# Patient Record
Sex: FEMALE | Race: WHITE | HISPANIC OR LATINO | Employment: FULL TIME | URBAN - METROPOLITAN AREA
[De-identification: names, ages, dates, MRNs, and addresses within clinical notes are randomized per-mention and may not be internally consistent; named-entity substitution may affect disease eponyms.]

---

## 2020-09-08 ENCOUNTER — OFFICE VISIT (OUTPATIENT)
Dept: MEDICAL GROUP | Facility: MEDICAL CENTER | Age: 25
End: 2020-09-08
Attending: PHYSICIAN ASSISTANT
Payer: MEDICAID

## 2020-09-08 VITALS
TEMPERATURE: 97.5 F | WEIGHT: 118.1 LBS | DIASTOLIC BLOOD PRESSURE: 60 MMHG | OXYGEN SATURATION: 100 % | BODY MASS INDEX: 19.68 KG/M2 | SYSTOLIC BLOOD PRESSURE: 108 MMHG | HEART RATE: 63 BPM | HEIGHT: 65 IN

## 2020-09-08 DIAGNOSIS — Z00.00 HEALTHCARE MAINTENANCE: ICD-10-CM

## 2020-09-08 DIAGNOSIS — F33.1 MODERATE EPISODE OF RECURRENT MAJOR DEPRESSIVE DISORDER (HCC): ICD-10-CM

## 2020-09-08 DIAGNOSIS — R63.4 WEIGHT LOSS: ICD-10-CM

## 2020-09-08 DIAGNOSIS — N12 PYELONEPHRITIS: ICD-10-CM

## 2020-09-08 PROCEDURE — 99214 OFFICE O/P EST MOD 30 MIN: CPT | Performed by: PHYSICIAN ASSISTANT

## 2020-09-08 PROCEDURE — 99204 OFFICE O/P NEW MOD 45 MIN: CPT | Performed by: PHYSICIAN ASSISTANT

## 2020-09-08 RX ORDER — PHENAZOPYRIDINE HYDROCHLORIDE 200 MG/1
TABLET, FILM COATED ORAL
COMMUNITY
Start: 2020-08-25 | End: 2020-10-06

## 2020-09-08 RX ORDER — CEFDINIR 300 MG/1
CAPSULE ORAL
COMMUNITY
Start: 2020-08-25 | End: 2020-10-06

## 2020-09-08 ASSESSMENT — ENCOUNTER SYMPTOMS
WHEEZING: 0
NAUSEA: 0
DOUBLE VISION: 0
WEIGHT LOSS: 1
COUGH: 0
SORE THROAT: 0
PHOTOPHOBIA: 0
HEADACHES: 0
PALPITATIONS: 0
CLAUDICATION: 0
BLURRED VISION: 0
DIARRHEA: 0
WEAKNESS: 0
SHORTNESS OF BREATH: 0
FEVER: 0
CHILLS: 0
VOMITING: 0
CONSTIPATION: 0
NERVOUS/ANXIOUS: 0
SINUS PAIN: 0
DEPRESSION: 1
BLOOD IN STOOL: 0
DIZZINESS: 1
TINGLING: 1

## 2020-09-08 ASSESSMENT — PATIENT HEALTH QUESTIONNAIRE - PHQ9
5. POOR APPETITE OR OVEREATING: 3 - NEARLY EVERY DAY
SUM OF ALL RESPONSES TO PHQ QUESTIONS 1-9: 19
CLINICAL INTERPRETATION OF PHQ2 SCORE: 5

## 2020-09-08 ASSESSMENT — LIFESTYLE VARIABLES: SUBSTANCE_ABUSE: 0

## 2020-09-08 NOTE — ASSESSMENT & PLAN NOTE
"Alma Rosa reports a history of significant weight loss that started last fall. She reports that she had a significantly decreased appetite in which she has not wanted to eat. She states that she had lost 20 pounds in the fall due to this and ever since has had difficulty in gaining the weight back. She states that since then her appetite has returned, however, she has noticed that she is unable to eat dairy or meat and is unsure as to why. She reports that last Thursday she was grabbing something to eat because she was hungry and all of a sudden started experiencing nausea, \"full body tingling\", dizziness and vision changes that she described as \"the sides of her vision going black\". She reports that this lasted for 2 minutes and then resolved. She has not experienced another episode since.     "

## 2020-09-08 NOTE — ASSESSMENT & PLAN NOTE
26 yo female who presents today for evaluation of depressed mood and/or loss of interests/pleasure most days. There is no imminent risk of serious self-harm/suicide. PHQ9 obtained in clinic today revealed a score of 19 indicating moderately-severe depression. No medications, comorbid medical conditions or substance abuse causing depression. Denies hx of treatment resistant depression, current ash, hypomania or psychosis. No personal or FMHx of bipolar disorder.

## 2020-09-08 NOTE — PROGRESS NOTES
"Chief Complaint   Patient presents with   • Establish Care   • Hospital Follow-up     uti       Subjective:     HPI:   Alma Rosa Villalobos is a 25 y.o. female here to establish care,  and to discuss the evaluation and management of:    Weight loss  Alma Rosa reports a history of significant weight loss that started last fall. She reports that she had a significantly decreased appetite in which she has not wanted to eat. She states that she had lost 20 pounds in the fall due to this and ever since has had difficulty in gaining the weight back. She states that since then her appetite has returned, however, she has noticed that she is unable to eat dairy or meat and is unsure as to why. She reports that last Thursday she was grabbing something to eat because she was hungry and all of a sudden started experiencing nausea, \"full body tingling\", dizziness and vision changes that she described as \"the sides of her vision going black\". She reports that this lasted for 2 minutes and then resolved. She has not experienced another episode since.     Moderate episode of recurrent major depressive disorder (HCC)  26 yo female who presents today for evaluation of depressed mood and/or loss of interests/pleasure most days. There is no imminent risk of serious self-harm/suicide. PHQ9 obtained in clinic today revealed a score of 19 indicating moderately-severe depression. No medications, comorbid medical conditions or substance abuse causing depression. Denies hx of treatment resistant depression, current ash, hypomania or psychosis. No personal or FMHx of bipolar disorder.    Pyelonephritis  Alma Rosa reports today for a hospital follow up for Pyelonephritis that occurred 3 weeks ago. She is currently on an antibiotic regimen that she will be finishing up in the next 2 days. She reports that her symptoms have resolved and she is feeling much better. She has no concerns regarding this today.        ROS  Review of Systems "   Constitutional: Positive for weight loss. Negative for chills, fever and malaise/fatigue.   HENT: Negative for congestion, sinus pain and sore throat.    Eyes: Negative for blurred vision, double vision and photophobia.   Respiratory: Negative for cough, shortness of breath and wheezing.    Cardiovascular: Negative for chest pain, palpitations, claudication and leg swelling.   Gastrointestinal: Negative for blood in stool, constipation, diarrhea, melena, nausea and vomiting.   Genitourinary: Negative for dysuria, frequency, hematuria and urgency.   Skin: Negative for itching and rash.   Neurological: Positive for dizziness and tingling. Negative for weakness and headaches.   Psychiatric/Behavioral: Positive for depression. Negative for substance abuse and suicidal ideas. The patient is not nervous/anxious.        No Known Allergies    Current medicines (including changes today)  Current Outpatient Medications   Medication Sig Dispense Refill   • cefdinir (OMNICEF) 300 MG Cap TK ONE C PO  Q 12 H FOR 10 DAYS     • phenazopyridine (PYRIDIUM) 200 MG Tab TK 1 T PO Q EIGHT H PRF URINARY PAIN       No current facility-administered medications for this visit.      She  has a past medical history of Urinary tract infection, site not specified. She also has no past medical history of Headache(784.0).  She  has no past surgical history on file.  Social History     Tobacco Use   • Smoking status: Never Smoker   • Smokeless tobacco: Never Used   Substance Use Topics   • Alcohol use: No   • Drug use: No       Family History   Problem Relation Age of Onset   • Genetic Disorder Mother         Graves   • Arthritis Mother         RA   • Genetic Disorder Maternal Aunt         Endometriosis   • Genetic Disorder Maternal Uncle         Graves   • Diabetes Maternal Grandmother    • Heart Disease Maternal Grandmother    • Hypertension Maternal Grandmother    • Hyperlipidemia Maternal Grandmother      Family Status   Relation Name Status  "  • Mo  Alive   • Fa  Alive   • Sis ##Sis1 Alive   • Bro ##Bro1 Alive   • MAunt ##MAunt1 Alive   • MUnc ##MUnc1 Alive   • PAunt ##PAunt1 Alive   • PUnc ##PUnc1 Alive   • MGMo  Alive   • MGFa  Alive   • PGMo  Alive   • PGFa  Alive       Patient Active Problem List    Diagnosis Date Noted   • Pyelonephritis 09/08/2020   • Weight loss 09/08/2020   • Moderate episode of recurrent major depressive disorder (HCC) 09/08/2020        Objective:     /60 (BP Location: Right arm, Patient Position: Sitting, BP Cuff Size: Adult)   Pulse 63   Temp 36.4 °C (97.5 °F) (Temporal)   Ht 1.651 m (5' 5\")   Wt 53.6 kg (118 lb 1.6 oz)   SpO2 100%  Body mass index is 19.65 kg/m².    Physical Exam:  Physical Exam   Constitutional: She is oriented to person, place, and time and well-developed, well-nourished, and in no distress.   HENT:   Head: Normocephalic.   Right Ear: External ear normal.   Left Ear: External ear normal.   Eyes: Pupils are equal, round, and reactive to light.   Neck: Normal range of motion. No thyromegaly present.   Cardiovascular: Normal rate, regular rhythm and normal heart sounds.   Pulmonary/Chest: Effort normal and breath sounds normal.   Musculoskeletal: Normal range of motion.   Lymphadenopathy:     She has no cervical adenopathy.        Right: No supraclavicular adenopathy present.        Left: No supraclavicular adenopathy present.   Neurological: She is alert and oriented to person, place, and time. She has normal motor skills, normal sensation and normal strength. Gait normal.   Skin: Skin is warm and dry.   Psychiatric: Affect and judgment normal.   Vitals reviewed.       Assessment and Plan:     The following treatment plan was discussed:    1. Weight loss  - This is a chronic condition that has plateaued.   - Patient is experiencing associated symptoms that may indicated a thyroid disorder vs vitamin deficiency vs anemia.  - Exam benign today in the clinic.  - We will order labs for further " evaluation.     2. Healthcare maintenance  - CBC WITHOUT DIFFERENTIAL; Future  - HEMOGLOBIN A1C; Future  - TSH; Future  - FREE THYROXINE; Future  - VITAMIN B12; Future  - VITAMIN D,25 HYDROXY; Future  - [facility] the [facility] once I have received and reviewed her lab results.     3. Moderate episode of recurrent major depressive disorder (HCC)  - This is a chronic condition.   - No imminent risk of suicidal or homicidal ideation.   - PHQ-9 performed in clinic today revealed a score of 19 indicating moderately severe depression.  - The patient would benefit greatly from combination therapy with a medication and CBT.    - However, the patient would like to forego medication at this time and solely focus on therapy.   - REFERRAL TO PSYCHOLOGY for CBT.   - I will check in on the patient in 6 weeks to see how therapy is going.     4. Pyelonephritis  - Healing well. Vitals are stable.   - Continue with antibiotic regimen until completed.   - If symptoms return, scheduled a follow up visit for further evaluation.        Any change or worsening of signs or symptoms, patient encouraged to follow-up or report to emergency room for further evaluation. Patient verbalizes understanding and agrees.    Follow-Up: Return if symptoms worsen or fail to improve.      PLEASE NOTE: This dictation was created using voice recognition software. I have made every reasonable attempt to correct obvious errors, but I expect that there are errors of grammar and possibly content that I did not discover before finalizing the note.

## 2020-09-08 NOTE — ASSESSMENT & PLAN NOTE
Alma Rosa reports today for a hospital follow up for Pyelonephritis that occurred 3 weeks ago. She is currently on an antibiotic regimen that she will be finishing up in the next 2 days. She reports that her symptoms have resolved and she is feeling much better. She has no concerns regarding this today.

## 2020-09-11 NOTE — RESULT ENCOUNTER NOTE
Results reviewed and released to [employer].    No anemia, vitamin deficiencies or thyroid disorder.  Vitamin D low requiring re-supplementation.

## 2020-10-06 ENCOUNTER — NURSE TRIAGE (OUTPATIENT)
Dept: HEALTH INFORMATION MANAGEMENT | Facility: OTHER | Age: 25
End: 2020-10-06

## 2020-10-06 ENCOUNTER — HOSPITAL ENCOUNTER (OUTPATIENT)
Facility: MEDICAL CENTER | Age: 25
End: 2020-10-06
Attending: PHYSICIAN ASSISTANT
Payer: COMMERCIAL

## 2020-10-06 ENCOUNTER — OFFICE VISIT (OUTPATIENT)
Dept: URGENT CARE | Facility: PHYSICIAN GROUP | Age: 25
End: 2020-10-06
Payer: COMMERCIAL

## 2020-10-06 VITALS
WEIGHT: 118 LBS | SYSTOLIC BLOOD PRESSURE: 110 MMHG | DIASTOLIC BLOOD PRESSURE: 72 MMHG | HEIGHT: 65 IN | BODY MASS INDEX: 19.66 KG/M2 | TEMPERATURE: 98 F | HEART RATE: 102 BPM | OXYGEN SATURATION: 96 %

## 2020-10-06 DIAGNOSIS — N12 PYELONEPHRITIS: Primary | ICD-10-CM

## 2020-10-06 DIAGNOSIS — N12 PYELONEPHRITIS: ICD-10-CM

## 2020-10-06 DIAGNOSIS — R10.9 BILATERAL FLANK PAIN: ICD-10-CM

## 2020-10-06 DIAGNOSIS — R11.0 NAUSEA: ICD-10-CM

## 2020-10-06 DIAGNOSIS — R50.9 FEVER, UNSPECIFIED FEVER CAUSE: ICD-10-CM

## 2020-10-06 LAB
APPEARANCE UR: CLEAR
BILIRUB UR STRIP-MCNC: NORMAL MG/DL
COLOR UR AUTO: YELLOW
GLUCOSE UR STRIP.AUTO-MCNC: NORMAL MG/DL
INT CON NEG: NEGATIVE
INT CON POS: POSITIVE
KETONES UR STRIP.AUTO-MCNC: 80 MG/DL
LEUKOCYTE ESTERASE UR QL STRIP.AUTO: NORMAL
NITRITE UR QL STRIP.AUTO: NORMAL
PH UR STRIP.AUTO: 6 [PH] (ref 5–8)
POC URINE PREGNANCY TEST: NORMAL
PROT UR QL STRIP: 30 MG/DL
RBC UR QL AUTO: NORMAL
SP GR UR STRIP.AUTO: 1.01
UROBILINOGEN UR STRIP-MCNC: 0.2 MG/DL

## 2020-10-06 PROCEDURE — 99204 OFFICE O/P NEW MOD 45 MIN: CPT | Performed by: PHYSICIAN ASSISTANT

## 2020-10-06 PROCEDURE — 81025 URINE PREGNANCY TEST: CPT | Performed by: PHYSICIAN ASSISTANT

## 2020-10-06 PROCEDURE — 87077 CULTURE AEROBIC IDENTIFY: CPT

## 2020-10-06 PROCEDURE — 87086 URINE CULTURE/COLONY COUNT: CPT

## 2020-10-06 PROCEDURE — 87186 SC STD MICRODIL/AGAR DIL: CPT

## 2020-10-06 PROCEDURE — 81002 URINALYSIS NONAUTO W/O SCOPE: CPT | Performed by: PHYSICIAN ASSISTANT

## 2020-10-06 RX ORDER — CIPROFLOXACIN 500 MG/1
500 TABLET, FILM COATED ORAL EVERY 12 HOURS
Qty: 14 TAB | Refills: 0 | Status: SHIPPED | OUTPATIENT
Start: 2020-10-06 | End: 2020-10-13

## 2020-10-06 NOTE — TELEPHONE ENCOUNTER
Regarding: kidney and abdominal pain also fevers  ----- Message from Taylor Nissen sent at 10/6/2020 12:26 PM PDT -----  Patient states that they have been having kidney pain, fevers and abdominal pain.

## 2020-10-06 NOTE — TELEPHONE ENCOUNTER
"Patient is reporting pain on both sides of her back. Headaches since last week.  Vomiting, diarrhea, Fever since Wednesday (100).  No burning or stinging with urination.  Patient reports history of UTI since she was young.  She does not feel like this is UTI symptoms.    This is the 3 time patient reports these symptoms.  Pain was so bad once and she went to ED ([facility]) and given something for pain.    Reason for Disposition  • Constant abdominal pain lasting > 2 hours    Additional Information  • Negative: Passed out (i.e., fainted, collapsed and was not responding)  • Negative: Shock suspected (e.g., cold/pale/clammy skin, too weak to stand, low BP, rapid pulse)  • Negative: Sounds like a life-threatening emergency to the triager  • Negative: Chest pain  • Negative: Pain is mainly in upper abdomen (if needed ask: 'is it mainly above the belly button?')  • Negative: Abdominal pain and pregnant > 20 weeks  • Negative: Abdominal pain and pregnant < 20 weeks  • Negative: SEVERE abdominal pain (e.g., excruciating)  • Negative: Vomiting red blood or black (coffee ground) material  • Negative: Bloody, black, or tarry bowel movements    Answer Assessment - Initial Assessment Questions  1. LOCATION: \"Where does it hurt?\"       Lower back sides and stomach  2. RADIATION: \"Does the pain shoot anywhere else?\" (e.g., chest, back)      Around the sides  3. ONSET: \"When did the pain begin?\" (e.g., minutes, hours or days ago)       Last week but continues to get worse  4. SUDDEN: \"Gradual or sudden onset?\"      gradually  5. PATTERN \"Does the pain come and go, or is it constant?\"     - If constant: \"Is it getting better, staying the same, or worsening?\"       (Note: Constant means the pain never goes away completely; most serious pain is constant and it progresses)      - If intermittent: \"How long does it last?\" \"Do you have pain now?\"      (Note: Intermittent means the pain goes away completely between bouts)      no  6. " "SEVERITY: \"How bad is the pain?\"  (e.g., Scale 1-10; mild, moderate, or severe)    - MILD (1-3): doesn't interfere with normal activities, abdomen soft and not tender to touch     - MODERATE (4-7): interferes with normal activities or awakens from sleep, tender to touch     - SEVERE (8-10): excruciating pain, doubled over, unable to do any normal activities       6/10  7. RECURRENT SYMPTOM: \"Have you ever had this type of abdominal pain before?\" If so, ask: \"When was the last time?\" and \"What happened that time?\"       3 times before and went to [facility]  8. CAUSE: \"What do you think is causing the abdominal pain?\"      unknown  9. RELIEVING/AGGRAVATING FACTORS: \"What makes it better or worse?\" (e.g., movement, antacids, bowel movement)      no  10. OTHER SYMPTOMS: \"Has there been any vomiting, diarrhea, constipation, or urine problems?\"        Fever, headaches, diarrhea  11. PREGNANCY: \"Is there any chance you are pregnant?\" \"When was your last menstrual period?\"        No, but had different period with spotting that ended on Saturday    Protocols used: ABDOMINAL PAIN - FEMALE-A-OH      "

## 2020-10-07 NOTE — PROGRESS NOTES
Subjective:   Pt is a 25 y.o. female who presents with Emesis (pelvic pain, fever , headaches L side flank pain x5days)            HPI  Negative per pt.   Pt notes B/L flank pain, fevers, headaches, NV and lower abd pain x 5 days. Pt has not taken any Rx medications for this condition. Pt states the pain is a 5/10, aching in nature and worse at night. Pt denies CP, SOB, diarrhea, paresthesias,  dizziness, change in vision, hives, or other joint pain. The pt's medication list, problem list, and allergies have been evaluated and reviewed during today's visit.    PMH:  Past Medical History:   Diagnosis Date   • Urinary tract infection, site not specified        PSH:  Negative per pt.      Fam Hx:  family history includes Arthritis in her mother; Diabetes in her maternal grandmother; Genetic Disorder in her maternal aunt, maternal uncle, and mother; Heart Disease in her maternal grandmother; Hyperlipidemia in her maternal grandmother; Hypertension in her maternal grandmother.  Family Status   Relation Name Status   • Mo  Alive   • Fa  Alive   • Sis  Alive   • Bro  Alive   • MAunt  Alive   • MUnc  Alive   • PAunt  Alive   • PUnc  Alive   • MGMo  Alive   • MGFa  Alive   • PGMo  Alive   • PGFa  Alive       Soc HX:  Social History     Socioeconomic History   • Marital status: Single     Spouse name: Not on file   • Number of children: Not on file   • Years of education: Not on file   • Highest education level: Not on file   Occupational History   • Not on file   Social Needs   • Financial resource strain: Not on file   • Food insecurity     Worry: Not on file     Inability: Not on file   • Transportation needs     Medical: Not on file     Non-medical: Not on file   Tobacco Use   • Smoking status: Never Smoker   • Smokeless tobacco: Never Used   Substance and Sexual Activity   • Alcohol use: No   • Drug use: Yes     Types: Marijuana   • Sexual activity: Never   Lifestyle   • Physical activity     Days per week: Not on file  "    Minutes per session: Not on file   • Stress: Not on file   Relationships   • Social connections     Talks on phone: Not on file     Gets together: Not on file     Attends Islam service: Not on file     Active member of club or organization: Not on file     Attends meetings of clubs or organizations: Not on file     Relationship status: Not on file   • Intimate partner violence     Fear of current or ex partner: Not on file     Emotionally abused: Not on file     Physically abused: Not on file     Forced sexual activity: Not on file   Other Topics Concern   • Not on file   Social History Narrative   • Not on file         Medications:    Current Outpatient Medications:   •  ciprofloxacin (CIPRO) 500 MG Tab, Take 1 Tab by mouth every 12 hours for 7 days., Disp: 14 Tab, Rfl: 0      Allergies:  Patient has no known allergies.    ROS  Review of Systems   Constitutional: Negative for fever, chills and malaise/fatigue.   HENT: Negative for congestion and sore throat.    Eyes: Negative for blurred vision, double vision and photophobia.   Respiratory: Negative for cough and shortness of breath.    Cardiovascular: Negative for chest pain and palpitations.   Gastrointestinal: +nausea, vomiting, lower abdominal pain, NEG diarrhea and constipation.   Genitourinary: Positive for +flank pain.   Musculoskeletal: Negative for joint pain and myalgias.   Skin: Negative for rash.   Neurological: Negative for dizziness, tingling and +headaches.   Endo/Heme/Allergies: Does not bruise/bleed easily.   Psychiatric/Behavioral: Negative for depression. The patient is not nervous/anxious.           Objective:     /72   Pulse (!) 102   Temp 36.7 °C (98 °F) (Temporal)   Ht 1.651 m (5' 5\")   Wt 53.5 kg (118 lb)   LMP 09/23/2020   SpO2 96%   BMI 19.64 kg/m²      Physical Exam  Constitutional: PT is oriented to person, place, and time. PT appears well-developed and well-nourished. No distress.   HENT:   Head: Normocephalic and " atraumatic.   Mouth/Throat: Oropharynx is clear and moist. No oropharyngeal exudate.   Eyes: Conjunctivae normal and EOM are normal. Pupils are equal, round, and reactive to light.   Neck: Normal range of motion. Neck supple. No thyromegaly present.   Cardiovascular: Normal rate, regular rhythm, normal heart sounds and intact distal pulses.  Exam reveals no gallop and no friction rub.    No murmur heard.  Pulmonary/Chest: Effort normal and breath sounds normal. No respiratory distress. PT has no wheezes. PT has no rales. Pt exhibits no tenderness.   Abdominal: Soft. Bowel sounds are normal. PT exhibits no distension and no mass. There is no tenderness. There is no rebound and no guarding.   Musculoskeletal: Normal range of motion. PT exhibits no edema and no tenderness.   Neurological: PT is alert and oriented to person, place, and time. PT has normal reflexes. No cranial nerve deficit.   Skin: Skin is warm and dry. No rash noted. PT is not diaphoretic. No erythema.       Psychiatric: PT has a normal mood and affect. PT behavior is normal. Judgment and thought content normal.             Assessment/Plan:        1. Pyelonephritis     - POCT Urinalysis-->LEUKS AND BLOOD  - POCT PREGNANCY-->NEG    2. Fever, unspecified fever cause      3. Nausea      4. Bilateral flank pain    - Urine Culture; Future  - ciprofloxacin (CIPRO) 500 MG Tab; Take 1 Tab by mouth every 12 hours for 7 days.  Dispense: 14 Tab; Refill: 0      STRICT ER precautions given  Rest, fluids encouraged.  AVS with medical info given.  Pt was in full understanding and agreement with the plan.  Differential diagnosis, natural history, supportive care, and indications for immediate follow-up discussed. All questions answered. Patient agrees with the plan of care.  Follow-up as needed if symptoms worsen or fail to improve to PCP, Urgent care or Emergency Room.

## 2020-10-07 NOTE — PATIENT INSTRUCTIONS
Pyelonephritis, Adult    Pyelonephritis is an infection that occurs in the kidney. The kidneys are organs that help clean the blood by moving waste out of the blood and into the pee (urine). This infection can happen quickly, or it can last for a long time. In most cases, it clears up with treatment and does not cause other problems.  What are the causes?  This condition may be caused by:  · Germs (bacteria) going from the bladder up to the kidney. This may happen after having a bladder infection.  · Germs going from the blood to the kidney.  What increases the risk?  This condition is more likely to develop in:  · Pregnant women.  · Older people.  · People who have any of these conditions:  ? Diabetes.  ? Inflammation of the prostate gland (prostatitis), in males.  ? Kidney stones or bladder stones.  ? Other problems with the kidney or the parts of your body that carry pee from the kidneys to the bladder (ureters).  ? Cancer.  · People who have a small, thin tube (catheter) placed in the bladder.  · People who are sexually active.  · Women who use a medicine that kills sperm (spermicide) to prevent pregnancy.  · People who have had a prior urinary tract infection (UTI).  What are the signs or symptoms?  Symptoms of this condition include:  · Peeing often.  · A strong urge to pee right away.  · Burning or stinging when peeing.  · Belly pain.  · Back pain.  · Pain in the side (flank area).  · Fever or chills.  · Blood in the pee, or dark pee.  · Feeling sick to your stomach (nauseous) or throwing up (vomiting).  How is this treated?  This condition may be treated by:  · Taking antibiotic medicines by mouth (orally).  · Drinking enough fluids.  If the infection is bad, you may need to stay in the hospital. You may be given antibiotics and fluids that are put directly into a vein through an IV tube.  In some cases, other treatments may be needed.  Follow these instructions at home:  Medicines  · Take your  antibiotic medicine as told by your doctor. Do not stop taking the antibiotic even if you start to feel better.  · Take over-the-counter and prescription medicines only as told by your doctor.  General instructions    · Drink enough fluid to keep your pee pale yellow.  · Avoid caffeine, tea, and carbonated drinks.  · Pee (urinate) often. Avoid holding in pee for long periods of time.  · Pee before and after sex.  · After pooping (having a bowel movement), women should wipe from front to back. Use each tissue only once.  · Keep all follow-up visits as told by your doctor. This is important.  Contact a doctor if:  · You do not feel better after 2 days.  · Your symptoms get worse.  · You have a fever.  Get help right away if:  · You cannot take your medicine or drink fluids as told.  · You have chills and shaking.  · You throw up.  · You have very bad pain in your side or back.  · You feel very weak or you pass out (faint).  Summary  · Pyelonephritis is an infection that occurs in the kidney.  · In most cases, this infection clears up with treatment and does not cause other problems.  · Take your antibiotic medicine as told by your doctor. Do not stop taking the antibiotic even if you start to feel better.  · Drink enough fluid to keep your pee pale yellow.  This information is not intended to replace advice given to you by your health care provider. Make sure you discuss any questions you have with your health care provider.  Document Released: 01/25/2006 Document Revised: 10/22/2019 Document Reviewed: 10/22/2019  Elsevier Patient Education © 2020 [employer]

## 2020-10-09 ENCOUNTER — TELEPHONE (OUTPATIENT)
Dept: URGENT CARE | Facility: CLINIC | Age: 25
End: 2020-10-09

## 2020-10-09 LAB
BACTERIA UR CULT: ABNORMAL
BACTERIA UR CULT: ABNORMAL
SIGNIFICANT IND 70042: ABNORMAL
SITE SITE: ABNORMAL
SOURCE SOURCE: ABNORMAL

## 2020-10-09 NOTE — TELEPHONE ENCOUNTER
Called and left message with pt about urine culture results which came back positive for E.coli.   I told her she could continue the abx therapy with a positive urine culture which was sensitive to the abx she was placed on.  Encouraged Pt to call back with questions.  Darryl Gardner PA-C

## 2023-04-06 PROCEDURE — 93005 ELECTROCARDIOGRAM TRACING: CPT

## 2023-04-06 PROCEDURE — 93005 ELECTROCARDIOGRAM TRACING: CPT | Performed by: STUDENT IN AN ORGANIZED HEALTH CARE EDUCATION/TRAINING PROGRAM

## 2023-04-06 PROCEDURE — 99283 EMERGENCY DEPT VISIT LOW MDM: CPT

## 2023-04-07 ENCOUNTER — APPOINTMENT (OUTPATIENT)
Dept: RADIOLOGY | Facility: MEDICAL CENTER | Age: 28
End: 2023-04-07
Attending: STUDENT IN AN ORGANIZED HEALTH CARE EDUCATION/TRAINING PROGRAM
Payer: MEDICAID

## 2023-04-07 ENCOUNTER — HOSPITAL ENCOUNTER (EMERGENCY)
Facility: MEDICAL CENTER | Age: 28
End: 2023-04-07
Attending: STUDENT IN AN ORGANIZED HEALTH CARE EDUCATION/TRAINING PROGRAM
Payer: MEDICAID

## 2023-04-07 VITALS
TEMPERATURE: 97.8 F | HEART RATE: 66 BPM | WEIGHT: 126.1 LBS | BODY MASS INDEX: 20.27 KG/M2 | OXYGEN SATURATION: 97 % | DIASTOLIC BLOOD PRESSURE: 70 MMHG | HEIGHT: 66 IN | RESPIRATION RATE: 18 BRPM | SYSTOLIC BLOOD PRESSURE: 132 MMHG

## 2023-04-07 DIAGNOSIS — R07.2 SUBSTERNAL CHEST PAIN: ICD-10-CM

## 2023-04-07 LAB
ALBUMIN SERPL BCP-MCNC: 4.2 G/DL (ref 3.2–4.9)
ALBUMIN/GLOB SERPL: 1.3 G/DL
ALP SERPL-CCNC: 94 U/L (ref 30–99)
ALT SERPL-CCNC: 8 U/L (ref 2–50)
ANION GAP SERPL CALC-SCNC: 11 MMOL/L (ref 7–16)
AST SERPL-CCNC: 15 U/L (ref 12–45)
BASOPHILS # BLD AUTO: 0.8 % (ref 0–1.8)
BASOPHILS # BLD: 0.07 K/UL (ref 0–0.12)
BILIRUB SERPL-MCNC: 0.2 MG/DL (ref 0.1–1.5)
BUN SERPL-MCNC: 15 MG/DL (ref 8–22)
CALCIUM ALBUM COR SERPL-MCNC: 8.6 MG/DL (ref 8.5–10.5)
CALCIUM SERPL-MCNC: 8.8 MG/DL (ref 8.5–10.5)
CHLORIDE SERPL-SCNC: 105 MMOL/L (ref 96–112)
CO2 SERPL-SCNC: 23 MMOL/L (ref 20–33)
CREAT SERPL-MCNC: 0.66 MG/DL (ref 0.5–1.4)
EKG IMPRESSION: NORMAL
EOSINOPHIL # BLD AUTO: 0.27 K/UL (ref 0–0.51)
EOSINOPHIL NFR BLD: 2.9 % (ref 0–6.9)
ERYTHROCYTE [DISTWIDTH] IN BLOOD BY AUTOMATED COUNT: 43.3 FL (ref 35.9–50)
GFR SERPLBLD CREATININE-BSD FMLA CKD-EPI: 123 ML/MIN/1.73 M 2
GLOBULIN SER CALC-MCNC: 3.2 G/DL (ref 1.9–3.5)
GLUCOSE SERPL-MCNC: 111 MG/DL (ref 65–99)
HCT VFR BLD AUTO: 38 % (ref 37–47)
HGB BLD-MCNC: 12.7 G/DL (ref 12–16)
IMM GRANULOCYTES # BLD AUTO: 0.02 K/UL (ref 0–0.11)
IMM GRANULOCYTES NFR BLD AUTO: 0.2 % (ref 0–0.9)
LYMPHOCYTES # BLD AUTO: 2.27 K/UL (ref 1–4.8)
LYMPHOCYTES NFR BLD: 24.5 % (ref 22–41)
MCH RBC QN AUTO: 29.3 PG (ref 27–33)
MCHC RBC AUTO-ENTMCNC: 33.4 G/DL (ref 33.6–35)
MCV RBC AUTO: 87.8 FL (ref 81.4–97.8)
MONOCYTES # BLD AUTO: 0.64 K/UL (ref 0–0.85)
MONOCYTES NFR BLD AUTO: 6.9 % (ref 0–13.4)
NEUTROPHILS # BLD AUTO: 5.99 K/UL (ref 2–7.15)
NEUTROPHILS NFR BLD: 64.7 % (ref 44–72)
NRBC # BLD AUTO: 0 K/UL
NRBC BLD-RTO: 0 /100 WBC
PLATELET # BLD AUTO: 274 K/UL (ref 164–446)
PMV BLD AUTO: 9.7 FL (ref 9–12.9)
POTASSIUM SERPL-SCNC: 3.8 MMOL/L (ref 3.6–5.5)
PROT SERPL-MCNC: 7.4 G/DL (ref 6–8.2)
RBC # BLD AUTO: 4.33 M/UL (ref 4.2–5.4)
SODIUM SERPL-SCNC: 139 MMOL/L (ref 135–145)
TROPONIN T SERPL-MCNC: <6 NG/L (ref 6–19)
WBC # BLD AUTO: 9.3 K/UL (ref 4.8–10.8)

## 2023-04-07 PROCEDURE — 71045 X-RAY EXAM CHEST 1 VIEW: CPT

## 2023-04-07 PROCEDURE — A9270 NON-COVERED ITEM OR SERVICE: HCPCS | Performed by: STUDENT IN AN ORGANIZED HEALTH CARE EDUCATION/TRAINING PROGRAM

## 2023-04-07 PROCEDURE — 80053 COMPREHEN METABOLIC PANEL: CPT

## 2023-04-07 PROCEDURE — 85025 COMPLETE CBC W/AUTO DIFF WBC: CPT

## 2023-04-07 PROCEDURE — 84484 ASSAY OF TROPONIN QUANT: CPT

## 2023-04-07 PROCEDURE — 700102 HCHG RX REV CODE 250 W/ 637 OVERRIDE(OP): Performed by: STUDENT IN AN ORGANIZED HEALTH CARE EDUCATION/TRAINING PROGRAM

## 2023-04-07 PROCEDURE — 36415 COLL VENOUS BLD VENIPUNCTURE: CPT

## 2023-04-07 RX ORDER — OMEPRAZOLE 20 MG/1
20 CAPSULE, DELAYED RELEASE ORAL DAILY
Qty: 30 CAPSULE | Refills: 0 | Status: SHIPPED | OUTPATIENT
Start: 2023-04-07 | End: 2023-04-12

## 2023-04-07 RX ORDER — OMEPRAZOLE 20 MG/1
20 CAPSULE, DELAYED RELEASE ORAL ONCE
Status: COMPLETED | OUTPATIENT
Start: 2023-04-07 | End: 2023-04-07

## 2023-04-07 RX ORDER — ACETAMINOPHEN 325 MG/1
650 TABLET ORAL ONCE
Status: COMPLETED | OUTPATIENT
Start: 2023-04-07 | End: 2023-04-07

## 2023-04-07 RX ORDER — ALUMINA, MAGNESIA, AND SIMETHICONE 2400; 2400; 240 MG/30ML; MG/30ML; MG/30ML
20 SUSPENSION ORAL ONCE
Status: COMPLETED | OUTPATIENT
Start: 2023-04-07 | End: 2023-04-07

## 2023-04-07 RX ADMIN — ACETAMINOPHEN 650 MG: 325 TABLET, FILM COATED ORAL at 03:00

## 2023-04-07 RX ADMIN — ALUMINUM HYDROXIDE, MAGNESIUM HYDROXIDE, AND DIMETHICONE 20 ML: 400; 400; 40 SUSPENSION ORAL at 01:22

## 2023-04-07 RX ADMIN — OMEPRAZOLE 20 MG: 20 CAPSULE, DELAYED RELEASE ORAL at 01:22

## 2023-04-07 ASSESSMENT — PAIN DESCRIPTION - PAIN TYPE
TYPE: ACUTE PAIN
TYPE: ACUTE PAIN

## 2023-04-07 NOTE — ED NOTES
PIV initiated. Blood drawn and sent to lab. Pt medicated and updated on POC. Resting comfortably with family at bedside

## 2023-04-07 NOTE — ED PROVIDER NOTES
"ED Provider Note    CHIEF COMPLAINT  Chief Complaint   Patient presents with    Chest Pain     Left sided, non-radiating chest pain x2 hours.        EXTERNAL RECORDS REVIEWED  Outpatient Notes no recent ED visits or outpatient visits since 2020    HPI/ROS  LIMITATION TO HISTORY   Select: : None    Alma Rosa Villalobos is a 27 y.o. female who presents with substernal and left-sided chest pain that started 2 hours ago and woke her up from sleep.  Patient states pain was worst when she was laying flat, was bad enough she had difficulty getting up.  She states similar pain has happened multiple times before, but this is the worst degree of pain she has had.  She denies associated shortness of breath, nausea, vomiting.  Denies any palpitations, dizziness, lightheadedness.  Denies hemoptysis, history of DVT or PE, denies recent surgeries immobilizations, leg pain, leg swelling, family history of bleeding or clotting disorders.  Denies any recent illnesses.    PAST MEDICAL HISTORY  Past Medical History:   Diagnosis Date    Urinary tract infection, site not specified         SURGICAL HISTORY  No past surgical history on file.     FAMILY HISTORY  Family History   Problem Relation Age of Onset    Genetic Disorder Mother         Graves    Arthritis Mother         RA    Genetic Disorder Maternal Aunt         Endometriosis    Genetic Disorder Maternal Uncle         Graves    Diabetes Maternal Grandmother     Heart Disease Maternal Grandmother     Hypertension Maternal Grandmother     Hyperlipidemia Maternal Grandmother        SOCIAL HISTORY       CURRENT MEDICATIONS  Home Medications    Medication Sig Taking? Last Dose Authorizing Provider   omeprazole (PRILOSEC) 20 MG delayed-release capsule Take 1 Capsule by mouth every day. Yes  Yue Dee M.D.       ALLERGIES  No Known Allergies    PHYSICAL EXAM  /70   Pulse 66   Temp 36.6 °C (97.8 °F) (Temporal)   Resp 18   Ht 1.676 m (5' 6\")   Wt 57.2 kg (126 lb 1.7 oz)  "  SpO2 97%   Constitutional: Alert in no apparent distress.  HENT: No signs of trauma, Bilateral external ears normal, Nose normal.   Eyes: Pupils are equal and reactive, Conjunctiva normal, Non-icteric.   Neck: Normal range of motion, No tenderness, Supple, No stridor.   Cardiovascular: Regular rate and rhythm, no murmurs.  Mild tenderness over the sternum.  Thorax & Lungs: Normal breath sounds, No respiratory distress, No wheezing  Abdomen: Soft, No tenderness, No peritoneal signs, No masses, No pulsatile masses.   Skin: Warm, Dry, No erythema, No rash.   Extremities: Intact distal pulses, No edema, No tenderness, No cyanosis  Musculoskeletal: Good range of motion in all major joints. No  major deformities noted.   Neurologic: Alert , Normal motor function, Normal speech, No focal deficits noted.   Psychiatric: Affect normal, Judgment normal, Mood normal.         DIAGNOSTIC STUDIES / PROCEDURES    LABS & EKG  I have independently interpreted this EKG     Results for orders placed or performed during the hospital encounter of 04/07/23   CBC WITH DIFFERENTIAL   Result Value Ref Range    WBC 9.3 4.8 - 10.8 K/uL    RBC 4.33 4.20 - 5.40 M/uL    Hemoglobin 12.7 12.0 - 16.0 g/dL    Hematocrit 38.0 37.0 - 47.0 %    MCV 87.8 81.4 - 97.8 fL    MCH 29.3 27.0 - 33.0 pg    MCHC 33.4 (L) 33.6 - 35.0 g/dL    RDW 43.3 35.9 - 50.0 fL    Platelet Count 274 164 - 446 K/uL    MPV 9.7 9.0 - 12.9 fL    Neutrophils-Polys 64.70 44.00 - 72.00 %    Lymphocytes 24.50 22.00 - 41.00 %    Monocytes 6.90 0.00 - 13.40 %    Eosinophils 2.90 0.00 - 6.90 %    Basophils 0.80 0.00 - 1.80 %    Immature Granulocytes 0.20 0.00 - 0.90 %    Nucleated RBC 0.00 /100 WBC    Neutrophils (Absolute) 5.99 2.00 - 7.15 K/uL    Lymphs (Absolute) 2.27 1.00 - 4.80 K/uL    Monos (Absolute) 0.64 0.00 - 0.85 K/uL    Eos (Absolute) 0.27 0.00 - 0.51 K/uL    Baso (Absolute) 0.07 0.00 - 0.12 K/uL    Immature Granulocytes (abs) 0.02 0.00 - 0.11 K/uL    NRBC (Absolute) 0.00  K/uL   COMP METABOLIC PANEL   Result Value Ref Range    Sodium 139 135 - 145 mmol/L    Potassium 3.8 3.6 - 5.5 mmol/L    Chloride 105 96 - 112 mmol/L    Co2 23 20 - 33 mmol/L    Anion Gap 11.0 7.0 - 16.0    Glucose 111 (H) 65 - 99 mg/dL    Bun 15 8 - 22 mg/dL    Creatinine 0.66 0.50 - 1.40 mg/dL    Calcium 8.8 8.5 - 10.5 mg/dL    AST(SGOT) 15 12 - 45 U/L    ALT(SGPT) 8 2 - 50 U/L    Alkaline Phosphatase 94 30 - 99 U/L    Total Bilirubin 0.2 0.1 - 1.5 mg/dL    Albumin 4.2 3.2 - 4.9 g/dL    Total Protein 7.4 6.0 - 8.2 g/dL    Globulin 3.2 1.9 - 3.5 g/dL    A-G Ratio 1.3 g/dL   TROPONIN   Result Value Ref Range    Troponin T <6 6 - 19 ng/L   CORRECTED CALCIUM   Result Value Ref Range    Correct Calcium 8.6 8.5 - 10.5 mg/dL   ESTIMATED GFR   Result Value Ref Range    GFR (CKD-EPI) 123 >60 mL/min/1.73 m 2   EKG (NOW)   Result Value Ref Range    Report       [facility] Emergency Dept.    Test Date:  2023  Pt Name:    BRANDIE STAFFORD             Department: ER  MRN:        [id number]                      Room:  Gender:     Female                       Technician: [id number]  :        1995                   Requested By:ER TRIAGE PROTOCOL  Order #:    [id number]                    Reading MD: Yue Dee    Measurements  Intervals                                Axis  Rate:       70                           P:          17  MO:         143                          QRS:        64  QRSD:       99                           T:          55  QT:         399  QTc:        431    Interpretive Statements  normal sinus rhythm rate of 70, normal axis, normal intervals, no ST  elevations,  depressions, or T wave inversions  Electronically Signed On 2023 0:47:33 PDT by Yue Dee         RADIOLOGY  I have independently interpreted the diagnostic imaging associated with this visit and am waiting the final reading from the radiologist.   My preliminary interpretation is a follows: 1 view chest x-ray  with no pneumothorax, no cardiomegaly, no focal infiltrate    Radiologist interpretation:   DX-CHEST-PORTABLE (1 VIEW)   Final Result         1.  No acute cardiopulmonary disease.          COURSE & MEDICAL DECISION MAKING    ED Observation Status? No; Patient does not meet criteria for ED Observation.     INITIAL ASSESSMENT, COURSE AND PLAN  Care Narrative: 27-year-old female presenting with substernal/left-sided chest pain over the last 2 hours.  Pain is slightly improved by time seen.  She was hypertensive in triage but vital signs are otherwise normal.  No risk factors for PE, PERC negative.  No indication for PE work-up.  EKG is reassuring with no evidence of ischemia, dysrhythmia, right heart strain.  Will check troponin to rule out myocarditis.  Will check chest x-ray to rule out pneumothorax or pneumomediastinum although feel less likely.  History most concerning for esophageal spasm given recurrent's episodes in the past.  Will start Prilosec, Maalox.  Assuming labs are reassuring which I anticipate they likely will be.  Will discharge patient with Prilosec and PCP referral as she has no primary care doctor.    3:00 AM  Labs reassuring, patient's troponin is normal.  Heart score 0.  Her symptoms have improved although she does still have some chest pain will give Tylenol prior to discharge.      ADDITIONAL PROBLEM LIST    Substernal chest pain    DISPOSITION AND DISCUSSIONS    Barriers to care at this time, including but not limited to: Patient does not have established PCP.     Decision tools and prescription drugs considered including, but not limited to: HEART Score 0 and Antacids-- prilosec .    Discharged home in stable condition    FINAL DIAGNOSIS  1. Substernal chest pain Acute Referral to establish with [facility] PCP    omeprazole (PRILOSEC) 20 MG delayed-release capsule            Electronically signed by: Yue Dee M.D., 04/07/23 1:03 AM

## 2023-04-07 NOTE — ED TRIAGE NOTES
"Chief Complaint   Patient presents with    Chest Pain     Left sided, non-radiating chest pain x2 hours.      Pt ambulatory to triage with above complaints. Pt denies any medical/cardiac history. Pt denies shortness of breath.     EKG ordered. Pt educated on triage process, placed back in lobby, and instructed to inform staff of any changes.     BP (!) 132/101   Pulse 79   Temp 36.6 °C (97.8 °F) (Temporal)   Resp 18   Ht 1.676 m (5' 6\")   Wt 57.2 kg (126 lb 1.7 oz)   LMP 04/06/2023 (Exact Date)   SpO2 99%   BMI 20.35 kg/m²     "

## 2023-04-07 NOTE — ED NOTES
Pt educated on discharge instructions. All questions & concerns addressed. Pt ambulates to exit with steady gait and all belongings.    IV d/c'd

## 2023-04-07 NOTE — DISCHARGE INSTRUCTIONS
As we discussed we are starting you on an antacid medicine to see if this helps your symptoms.  We would like you to follow-up closely with her primary care doctor and have placed a referral to help you find one.     Return to the emergency department if symptoms worsen.

## 2023-04-12 ENCOUNTER — OFFICE VISIT (OUTPATIENT)
Dept: INTERNAL MEDICINE | Facility: OTHER | Age: 28
End: 2023-04-12
Payer: MEDICAID

## 2023-04-12 VITALS
BODY MASS INDEX: 20.25 KG/M2 | HEIGHT: 66 IN | RESPIRATION RATE: 20 BRPM | HEART RATE: 83 BPM | OXYGEN SATURATION: 100 % | SYSTOLIC BLOOD PRESSURE: 104 MMHG | DIASTOLIC BLOOD PRESSURE: 76 MMHG | TEMPERATURE: 98.9 F | WEIGHT: 126 LBS

## 2023-04-12 DIAGNOSIS — R07.89 OTHER CHEST PAIN: ICD-10-CM

## 2023-04-12 DIAGNOSIS — M54.50 ACUTE MIDLINE LOW BACK PAIN WITHOUT SCIATICA: ICD-10-CM

## 2023-04-12 PROCEDURE — 99204 OFFICE O/P NEW MOD 45 MIN: CPT | Mod: GC | Performed by: STUDENT IN AN ORGANIZED HEALTH CARE EDUCATION/TRAINING PROGRAM

## 2023-04-12 ASSESSMENT — PATIENT HEALTH QUESTIONNAIRE - PHQ9
9. THOUGHTS THAT YOU WOULD BE BETTER OFF DEAD, OR OF HURTING YOURSELF: NOT AT ALL
6. FEELING BAD ABOUT YOURSELF - OR THAT YOU ARE A FAILURE OR HAVE LET YOURSELF OR YOUR FAMILY DOWN: NOT AL ALL
SUM OF ALL RESPONSES TO PHQ QUESTIONS 1-9: 0
7. TROUBLE CONCENTRATING ON THINGS, SUCH AS READING THE NEWSPAPER OR WATCHING TELEVISION: NOT AT ALL
8. MOVING OR SPEAKING SO SLOWLY THAT OTHER PEOPLE COULD HAVE NOTICED. OR THE OPPOSITE, BEING SO FIGETY OR RESTLESS THAT YOU HAVE BEEN MOVING AROUND A LOT MORE THAN USUAL: NOT AT ALL
5. POOR APPETITE OR OVEREATING: NOT AT ALL
1. LITTLE INTEREST OR PLEASURE IN DOING THINGS: NOT AT ALL
2. FEELING DOWN, DEPRESSED, IRRITABLE, OR HOPELESS: NOT AT ALL
4. FEELING TIRED OR HAVING LITTLE ENERGY: NOT AT ALL
SUM OF ALL RESPONSES TO PHQ9 QUESTIONS 1 AND 2: 0
3. TROUBLE FALLING OR STAYING ASLEEP OR SLEEPING TOO MUCH: NOT AT ALL

## 2023-04-12 ASSESSMENT — FIBROSIS 4 INDEX: FIB4 SCORE: 0.5

## 2023-04-12 NOTE — ASSESSMENT & PLAN NOTE
Suspected musculoskeletal pain, likely related to work/life stressors and demands.  -counseled on tylenol/NSAID use  -PT referral placed today

## 2023-04-12 NOTE — ASSESSMENT & PLAN NOTE
Chest pain, occurs monthly, may last few days up to a week. Has a positional dependence, but also somewhat relieved by massage. Unclear pattern, though some components consistent with possible MSK/costochondritis, pericarditis, may be atypical acid reflux, or event anxiety related.  -counseled on symptoms journal  -counseled on famotidine trial  -consider ambulatory cardiac monitoring  -follow up as needed

## 2023-04-12 NOTE — PATIENT INSTRUCTIONS
Write down what happens the next time you get this chest pain. Big things to watch out for are foods you may have eaten, exercise, coughs/colds, anything that seems to make things better/worse. When it does happen, give us a call and we'll see if we can better catch what's going on.    May try pepcid or famotidine to see if it makes a difference.

## 2023-04-12 NOTE — PROGRESS NOTES
"      CC:   Chief Complaint   Patient presents with    [facility]                                                                                                                                           HPI:   Alma Rosa presents today with the following.    Concerns of chest pain, which occurs randomly couple times a month. Also has sporadic back pains.    Chest pain occurs couple times a month. Last even was around January, which lasted about 1 week. Chest pain had a significnat positional component--leaning forward and alsoo laying back flat. Middle of chest more affected. Last time more left-centre. Feels like a stabbing pain. Worsened with deeper inspirations. Does not recall any preceding viral type illnesses. Unsure Covid, but no significant symptoms to suggest so.     Low back pain off/on over the past year. Happens more so with positional changes.Sharp, stabbing pain. Not significantly changed with twisting. Radiates to hip/coccyx. No numbness/tingling.     Mx: MDD  Sx: n/a  Med: n/a  All: NKDA  Famhx: FgM: gastric CA, M: gastric CA; F: colon cancer?  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     Patient Active Problem List    Diagnosis Date Noted    Acute midline low back pain without sciatica 04/12/2023    Other chest pain 04/12/2023    Pyelonephritis 09/08/2020    Weight loss 09/08/2020    Moderate episode of recurrent major depressive disorder (HCC) 09/08/2020       No current outpatient medications on file.     No current facility-administered medications for this visit.         Allergies as of 04/12/2023    (No Known Allergies)          /76   Pulse 83   Temp 37.2 °C (98.9 °F) (Temporal)   Resp 20   Ht 1.676 m (5' 6\")   Wt 57.2 kg (126 lb)   LMP 04/06/2023 (Exact Date)   SpO2 100%   BMI 20.34 kg/m²     ROS     Physical Exam:  Gen:  Alert and oriented, No apparent distress.  Neuro:  CN II-XII intact, no focal " deficits  Lungs:  CTAB  CV:  RRR no m/g/r  Abd:  Soft, nontender, nondistended  Skin:  No acute rash/lesions  Back:  No spinal/paraspinal tenderness, no deficits/reproducible pain with leg raise, rotation.  Ext:  Shoulder/elbow flexion/extension 5/5 bilaterally and symmetric; ambulates independently with steady gait.      Assessment and Plan.   Alma Rosa Villalobos is a 27 y.o. female with the following issues:    Acute midline low back pain without sciatica  Suspected musculoskeletal pain, likely related to work/life stressors and demands.  -counseled on tylenol/NSAID use  -PT referral placed today    Other chest pain  Chest pain, occurs monthly, may last few days up to a week. Has a positional dependence, but also somewhat relieved by massage. Unclear pattern, though some components consistent with possible MSK/costochondritis, pericarditis, may be atypical acid reflux, or event anxiety related.  -counseled on symptoms journal  -counseled on famotidine trial  -consider ambulatory cardiac monitoring  -follow up as needed       Follow up 6 months, earlier if needed for pain/chest discomforts.

## 2023-06-07 ENCOUNTER — PHYSICAL THERAPY (OUTPATIENT)
Dept: PHYSICAL THERAPY | Facility: REHABILITATION | Age: 28
End: 2023-06-07
Attending: STUDENT IN AN ORGANIZED HEALTH CARE EDUCATION/TRAINING PROGRAM
Payer: MEDICAID

## 2023-06-07 DIAGNOSIS — M54.50 ACUTE MIDLINE LOW BACK PAIN WITHOUT SCIATICA: ICD-10-CM

## 2023-06-07 PROCEDURE — 97162 PT EVAL MOD COMPLEX 30 MIN: CPT

## 2023-06-07 ASSESSMENT — ENCOUNTER SYMPTOMS
PAIN SCALE AT HIGHEST: 9
PAIN SCALE: 0
PAIN SCALE AT LOWEST: 0

## 2023-06-07 NOTE — OP THERAPY EVALUATION
"  Outpatient Physical Therapy  INITIAL EVALUATION    [address]  [address]  [facility] [address]  Phone:  [phone]  Fax:  [phone]    Date of Evaluation: 2023    Patient: Jina Villalobos  YOB: 1995  MRN: [id number]     Referring Provider: Bridger Cruz M.D.  [address] [place] [address]   Referring Diagnosis Acute midline low back pain without sciatica [M54.50]     Time Calculation  Start time: 1533  Stop time: 1615 Time Calculation (min): 42 minutes         Chief Complaint: Back Injury    Visit Diagnoses     ICD-10-CM   1. Acute midline low back pain without sciatica  M54.50       Date of onset of impairment: 3/1/2017    Subjective:   History of Present Illness:     Mechanism of injury:  Pt is a 28 yo female presenting to PT with c/o LBP. Pt reports symptoms began during pregnancy w/ radicular symptoms in 2017. Reports symptoms have been recurrent since. Reports symptoms began again ~2 months ago and radiates to hips, but have since decreased. Describes symptoms as \"sharp\", feels like hips are \"tucked in\". Denies NT in LEs, denies changes in bowel/bladder, no saddle paresthesias. 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 Reports difficulty helping daughter w/ ADLs, bending over. Reports difficulty showering, getting dressed.     Aggs: walking long periods of time, > 30 minutes. Bending forward. Lifting objects even as light as grocery  Easers: laying down flat on back  Irritability: mod, takes 1-2 hours of rest to decrease symptoms 50%  Sleep disturbance:  Difficulty falling asleep  Pain:     Current pain ratin    At best pain ratin    At worst pain ratin  Patient Goals:     Patient goals for therapy:  Decreased pain    Other patient goals:  Prevention, start execising at the gym      Past Medical History:   Diagnosis Date    Urinary tract infection, site " "not specified      No past surgical history on file.  Social History     Tobacco Use    Smoking status: Never    Smokeless tobacco: Never   Vaping Use    Vaping Use: Former   Substance Use Topics    Alcohol use: Yes     Comment: social     Family and Occupational History     Socioeconomic History    Marital status: Single     Spouse name: Not on file    Number of children: Not on file    Years of education: Not on file    Highest education level: Not on file   Occupational History    Not on file       Objective     Neurological Testing     Myotome testing   Lumbar (left)   L1 (hip flexors): 5  L2 (hip flexors): 5  L3 (knee extensors): 5  L4 (ankle dorsiflexors): 5    Lumbar (right)   L1 (hip flexors): 5  L2 (hip flexors): 5  L3 (knee extensors): 5  L4 (ankle dorsiflexors): 5    Palpation   Left   No palpable tenderness to the piriformis and quadratus lumborum.     Right   No palpable tenderness to the piriformis and quadratus lumborum.     Active Range of Motion     Lumbar   Flexion: within functional limits (\"feels like a stretch\")  Extension: decreased  Left lateral flexion: within functional limits  Right lateral flexion: within functional limits  Left rotation: within functional limits  Right rotation: within functional limits  Left Hip   External rotation (90/90): 35 degrees     Right Hip   External rotation (90/90): 25 degrees     Passive Range of Motion   Left Hip   Internal rotation (90/90): 27 degrees     Right Hip   Internal rotation (90/90): 40 degrees     Strength:      Additional Strength Details  Mary Lou: 52.54 seconds  Bridge iso: 2:03 (unable to maintain proper positioning at 1:32)    Tests     Left Pelvic Girdle/Sacrum   Negative: downslip and upslip.     Right Pelvic Girdle/Sacrum   Negative: downslip and upslip.     Left Hip   Negative inflare and outflare.     Right Hip   Negative inflare and outflare.     Additional Tests Details  Positive prone instability test  No notable directional " preference        Therapeutic Exercises (CPT 42174):       Therapeutic Exercise Summary: HEP:  Bird dog, active piriformis stretch      Time-based treatments/modalities:           Assessment, Response and Plan:   Impairments: abnormal or restricted ROM, activity intolerance, impaired physical strength, lacks appropriate home exercise program and limited ADL's    Assessment details:  Pt is a 28 yo cis-female presenting to PT w/ clinical findings suggestive of lumbar instability. Pertinent clinical findings include proximal hip and lumbar extensor weakness. Demo decreased hip mobility, and positive provocative tests for lumbar instability. Impairments are associated w/ difficulty completing ADLs and daily tasks. Pt goal is to return to working full-time. The patient would benefit from skilled PT to address strength and ROM deficits in order to return to PLOF and increase participation with daily tasks. The pt has a good prognosis d/t age and overall good health. The patient states she understands and agrees with the plan of care. HEP w/ handout was reviewed and provided to the pt.   Barriers to therapy:  None  Prognosis: good    Goals:   Short Term Goals:   1. Pt will be independent with HEP 3-5x per week for improving strength, ROM and decreasing pain  2. Pt will demo ability to initiate core contraction mechanics in supine and standing for improved stability  3. Pt will report ability to tolerate standing x 30 minutes with pain 3/10 or less   4. Pt will demo proper squat mechanics in order to  objects from the ground  Short term goal time span:  2-4 weeks      Long Term Goals:    1. Pt will demo lumbar ROM WNL in order to perform ADLs with pain 2/10 or less  2. Pt will demo global hip strength 5/5 for improved stabilty and decreased pain  3. Pt will report ability to tolerate standing x 45 minutes with pain 3/10 or less  4. Pt will initiate gym exercise program  Long term goal time span:  4-6 weeks    Plan:    Therapy options:  Physical therapy treatment to continue  Planned therapy interventions:  E Stim Unattended (CPT 43576), Gait Training (CPT 35342), Mechanical Traction (CPT 05915), Neuromuscular Re-education (CPT 42226), Therapeutic Exercise (CPT 22041), Therapeutic Activities (CPT 70385) and Self Care ADL Training (CPT 61859)  Frequency:  1x week  Duration in visits:  8  Discussed with:  Patient      Functional Assessment Used  Oswestry Low Back Pain Disability Total Score: 28     Referring provider co-signature:  I have reviewed this plan of care and my co-signature certifies the need for services.    Certification Period: 06/07/2023 to  09/05/23    Physician Signature: ________________________________ Date: ______________

## 2023-06-14 ENCOUNTER — PHYSICAL THERAPY (OUTPATIENT)
Dept: PHYSICAL THERAPY | Facility: REHABILITATION | Age: 28
End: 2023-06-14
Attending: STUDENT IN AN ORGANIZED HEALTH CARE EDUCATION/TRAINING PROGRAM
Payer: MEDICAID

## 2023-06-14 DIAGNOSIS — M54.50 ACUTE MIDLINE LOW BACK PAIN WITHOUT SCIATICA: ICD-10-CM

## 2023-06-14 PROCEDURE — 97110 THERAPEUTIC EXERCISES: CPT

## 2023-06-14 NOTE — OP THERAPY DAILY TREATMENT
"  Outpatient Physical Therapy  DAILY TREATMENT     [facility] Physical [address].  [address]  [place] [address]  Phone:  [phone]  Fax:  [phone]    Date: 06/14/2023    Patient: Jina Villalobos  YOB: 1995  MRN: [id number]     Time Calculation    Start time: 1535  Stop time: 1615 Time Calculation (min): 40 minutes         Chief Complaint: Back Problem    Visit #: 2    SUBJECTIVE:  Pt reports no changes since initial eval.     Aggs: standing > 2 hours    OBJECTIVE:  Current objective measures:           Therapeutic Exercises (CPT 59599):     1. PT assisted L hip ER/IR w/ OP, x5', demo equal ROM bilaterally after    2. Bridge, to fatigue    3. Brdge with kickout, TA brace, to fatigue, painful    4. SB bridge iso, to fatigue, added to HEP    5. Bird dog, 10x5\" hold    6. Reverse clams, 10x10\" hold, added to HEP      Therapeutic Exercise Summary: HEP:  Bird dog, active piriformis stretch      Time-based treatments/modalities:    Physical Therapy Timed Treatment Charges  Therapeutic exercise minutes (CPT 91399): 40 minutes      ASSESSMENT:   Response to treatment: Progressed strength and stability exercises. Pt tolerated exercises well. demo good response to mobility activities. L hip ROM WNL by eos. Demo overall decreased extensor endurance. Progressed HEP to reflect new exercises, handout provided. Pt is progressing at this time and appropriate to cont PT.    PLAN/RECOMMENDATIONS:   Plan for treatment: therapy treatment to continue next visit.  Planned interventions for next visit: continue with current treatment.         "

## 2023-06-21 ENCOUNTER — PHYSICAL THERAPY (OUTPATIENT)
Dept: PHYSICAL THERAPY | Facility: REHABILITATION | Age: 28
End: 2023-06-21
Attending: STUDENT IN AN ORGANIZED HEALTH CARE EDUCATION/TRAINING PROGRAM
Payer: MEDICAID

## 2023-06-21 DIAGNOSIS — M54.50 ACUTE MIDLINE LOW BACK PAIN WITHOUT SCIATICA: ICD-10-CM

## 2023-06-21 PROCEDURE — 97530 THERAPEUTIC ACTIVITIES: CPT

## 2023-06-21 PROCEDURE — 97110 THERAPEUTIC EXERCISES: CPT

## 2023-06-21 NOTE — OP THERAPY DAILY TREATMENT
"  Outpatient Physical Therapy  DAILY TREATMENT     [facility] Physical [address]  [address]  [place] [address]  Phone:  [phone]  Fax:  [phone]    Date: 06/21/2023    Patient: Jina Villalobos  YOB: 1995  MRN: [id number]     Time Calculation    Start time: 1530  Stop time: 1612 Time Calculation (min): 42 minutes         Chief Complaint: Back Problem    Visit #: 3    SUBJECTIVE:  Pt reports back is feeling overall better. Has been able to walk longer distances.     OBJECTIVE:  Current objective measures: Hip ER/IR equal bilaterally WFL          Therapeutic Exercises (CPT 19437):     1. Active piriformis stretch, 2x30\" hold    2. SB bridge, 10x5\" hold, 1x fatigue    3. Bridge with march, core activation, to fatigue    4. Reverse clams, 10x10\" hold    5. Hurlder's step overs, x5 ea    6. [place] stretch, x30\" ea    7. Bird dog, not today      Therapeutic Exercise Summary: HEP:  Bird dog, active piriformis stretch    Therapeutic Treatments and Modalities:     1. Therapeutic Activities (CPT 91459)    Therapeutic Treatment and Modalities Summary: -Goblet sit<>stand 10#, x6  -Goblet squat 10#->12#, 2x10  -Stanley carry w/ high knee march 10->12#, x2 laps    Time-based treatments/modalities:    Physical Therapy Timed Treatment Charges  Therapeutic activity minutes (CPT 13008): 10 minutes  Therapeutic exercise minutes (CPT 09025): 32 minutes      ASSESSMENT:   Response to treatment: Pt demo good carryover of hip ROM/mobility from the previous session, demo ROM WNL bilaterally. Progressed functional strengthening exercises. Pt tolerated exercises well, demo decreased eccentric control during squat activities. Pt is progressing at his time and is appropriate to continue PT.    PLAN/RECOMMENDATIONS:   Plan for treatment: therapy treatment to continue next visit.  Planned interventions for next visit: continue with current treatment.         "

## 2023-06-28 ENCOUNTER — PHYSICAL THERAPY (OUTPATIENT)
Dept: PHYSICAL THERAPY | Facility: REHABILITATION | Age: 28
End: 2023-06-28
Attending: STUDENT IN AN ORGANIZED HEALTH CARE EDUCATION/TRAINING PROGRAM
Payer: MEDICAID

## 2023-06-28 DIAGNOSIS — M54.50 ACUTE MIDLINE LOW BACK PAIN WITHOUT SCIATICA: ICD-10-CM

## 2023-06-28 PROCEDURE — 97110 THERAPEUTIC EXERCISES: CPT

## 2023-06-28 NOTE — OP THERAPY DISCHARGE SUMMARY
Outpatient Physical Therapy  DISCHARGE SUMMARY NOTE      [facility] Physical [facility]  [address]  [address]  [facility] [address]  Phone:  [phone]  Fax:  [phone]    Date of Visit: 06/28/2023    Patient: Jina Villalobos  YOB: 1995  MRN: [id number]     Referring Provider: Bridger Cruz M.D.  [address] [place] [address]   Referring Diagnosis Low back pain, unspecified [M54.50]         Functional Assessment Used        Your patient is being discharged from Physical Therapy with the following comments:   Goals met    Comments:  Pt has attended 4 visits from 6/7/23 to 6/28/23. Pt has met all functional goals and is appropriate for DC. Reviewed HEP. Pt demo full independence and proper mechanics of all exercises.        Recommendations:  Continue independently with HEP    Jaydon Subramanian, PT, DPT    Date: 6/28/2023

## 2023-06-28 NOTE — OP THERAPY DAILY TREATMENT
"  Outpatient Physical Therapy  DAILY TREATMENT     [facility] Physical [address]  [address]  [place] [address]  Phone:  [phone]  Fax:  [phone]    Date: 06/28/2023    Patient: Jina Villalobos  YOB: 1995  MRN: [id number]     Time Calculation    Start time: 1533  Stop time: 1614 Time Calculation (min): 41 minutes         Chief Complaint: Back Problem    Visit #: 4    SUBJECTIVE:  Pt reports LBP is feeling a lot better. Is currently not having pain and is able to manage quickly during exacerbation. Requests DC from therapy.    Short Term Goals:   1. Pt will be independent with HEP 3-5x per week for improving strength, ROM and decreasing pain - met  2. Pt will demo ability to initiate core contraction mechanics in supine and standing for improved stability - met  3. Pt will report ability to tolerate standing x 30 minutes with pain 3/10 or less - met  4. Pt will demo proper squat mechanics in order to  objects from the ground - met  Short term goal time span:  2-4 weeks      Long Term Goals:    1. Pt will demo lumbar ROM WNL in order to perform ADLs with pain 2/10 or less  2. Pt will demo global hip strength 5/5 for improved stabilty and decreased pain - met  3. Pt will report ability to tolerate standing x 45 minutes with pain 3/10 or less - met  4. Pt will initiate gym exercise program - met  Long term goal time span:  4-6 weeks    OBJECTIVE:  Current objective measures:   Lumbar ROM WNL  Bridge iso w/ march: 3 minutes          Therapeutic Exercises (CPT 65447):     1. Active piriformis stretch, 2x30\" hold    2. SB bridge->marches, 10x10\" hold, 1x fatigue    3. [place] stretch, x30\" ea    4. Fire hydrant w/ IR, x10 ea    5. Goblet squat 15#, x10    6. Stanley carry w/ high knee march 15#, x1 lap    7. Bird dog, 10x10\" hold    8. Pallof press feet together GTB->, x10 ea      Therapeutic Exercise Summary: HEP:  Bird dog, active piriformis stretch      Time-based " treatments/modalities:    Physical Therapy Timed Treatment Charges  Therapeutic exercise minutes (CPT 34248): 41 minutes      ASSESSMENT:   Response to treatment: See DC note    PLAN/RECOMMENDATIONS:   Plan for treatment: discharge patient due to accomplished goals.

## 2023-07-05 ENCOUNTER — APPOINTMENT (OUTPATIENT)
Dept: PHYSICAL THERAPY | Facility: REHABILITATION | Age: 28
End: 2023-07-05
Attending: STUDENT IN AN ORGANIZED HEALTH CARE EDUCATION/TRAINING PROGRAM
Payer: MEDICAID

## 2023-07-12 ENCOUNTER — APPOINTMENT (OUTPATIENT)
Dept: PHYSICAL THERAPY | Facility: REHABILITATION | Age: 28
End: 2023-07-12
Attending: STUDENT IN AN ORGANIZED HEALTH CARE EDUCATION/TRAINING PROGRAM
Payer: MEDICAID

## 2023-07-19 ENCOUNTER — APPOINTMENT (OUTPATIENT)
Dept: PHYSICAL THERAPY | Facility: REHABILITATION | Age: 28
End: 2023-07-19
Attending: STUDENT IN AN ORGANIZED HEALTH CARE EDUCATION/TRAINING PROGRAM
Payer: MEDICAID

## 2023-07-26 ENCOUNTER — APPOINTMENT (OUTPATIENT)
Dept: PHYSICAL THERAPY | Facility: REHABILITATION | Age: 28
End: 2023-07-26
Attending: STUDENT IN AN ORGANIZED HEALTH CARE EDUCATION/TRAINING PROGRAM
Payer: MEDICAID